# Patient Record
Sex: MALE | Race: BLACK OR AFRICAN AMERICAN | NOT HISPANIC OR LATINO | Employment: FULL TIME | URBAN - METROPOLITAN AREA
[De-identification: names, ages, dates, MRNs, and addresses within clinical notes are randomized per-mention and may not be internally consistent; named-entity substitution may affect disease eponyms.]

---

## 2024-10-09 ENCOUNTER — HOSPITAL ENCOUNTER (EMERGENCY)
Facility: HOSPITAL | Age: 39
Discharge: HOME | End: 2024-10-09
Attending: STUDENT IN AN ORGANIZED HEALTH CARE EDUCATION/TRAINING PROGRAM
Payer: COMMERCIAL

## 2024-10-09 ENCOUNTER — APPOINTMENT (OUTPATIENT)
Dept: RADIOLOGY | Facility: HOSPITAL | Age: 39
End: 2024-10-09
Payer: COMMERCIAL

## 2024-10-09 VITALS
WEIGHT: 183.42 LBS | RESPIRATION RATE: 16 BRPM | TEMPERATURE: 97.5 F | DIASTOLIC BLOOD PRESSURE: 92 MMHG | SYSTOLIC BLOOD PRESSURE: 145 MMHG | OXYGEN SATURATION: 98 % | HEART RATE: 62 BPM | HEIGHT: 68 IN | BODY MASS INDEX: 27.8 KG/M2

## 2024-10-09 DIAGNOSIS — M54.6 ACUTE LEFT-SIDED THORACIC BACK PAIN: ICD-10-CM

## 2024-10-09 DIAGNOSIS — R07.81 RIB PAIN ON LEFT SIDE: Primary | ICD-10-CM

## 2024-10-09 LAB
ALBUMIN SERPL BCP-MCNC: 4.3 G/DL (ref 3.4–5)
ALP SERPL-CCNC: 45 U/L (ref 33–120)
ALT SERPL W P-5'-P-CCNC: 20 U/L (ref 10–52)
ANION GAP SERPL CALC-SCNC: 9 MMOL/L (ref 10–20)
APPEARANCE UR: CLEAR
AST SERPL W P-5'-P-CCNC: 21 U/L (ref 9–39)
BASOPHILS # BLD AUTO: 0.02 X10*3/UL (ref 0–0.1)
BASOPHILS NFR BLD AUTO: 0.4 %
BILIRUB SERPL-MCNC: 0.7 MG/DL (ref 0–1.2)
BILIRUB UR STRIP.AUTO-MCNC: NEGATIVE MG/DL
BNP SERPL-MCNC: 6 PG/ML (ref 0–99)
BUN SERPL-MCNC: 16 MG/DL (ref 6–23)
CALCIUM SERPL-MCNC: 8.6 MG/DL (ref 8.6–10.3)
CARDIAC TROPONIN I PNL SERPL HS: <3 NG/L (ref 0–20)
CHLORIDE SERPL-SCNC: 102 MMOL/L (ref 98–107)
CO2 SERPL-SCNC: 30 MMOL/L (ref 21–32)
COLOR UR: COLORLESS
CREAT SERPL-MCNC: 1.15 MG/DL (ref 0.5–1.3)
EGFRCR SERPLBLD CKD-EPI 2021: 84 ML/MIN/1.73M*2
EOSINOPHIL # BLD AUTO: 0.04 X10*3/UL (ref 0–0.7)
EOSINOPHIL NFR BLD AUTO: 0.8 %
ERYTHROCYTE [DISTWIDTH] IN BLOOD BY AUTOMATED COUNT: 11.6 % (ref 11.5–14.5)
GLUCOSE SERPL-MCNC: 86 MG/DL (ref 74–99)
GLUCOSE UR STRIP.AUTO-MCNC: NORMAL MG/DL
HCT VFR BLD AUTO: 43.3 % (ref 41–52)
HGB BLD-MCNC: 14.5 G/DL (ref 13.5–17.5)
IMM GRANULOCYTES # BLD AUTO: 0.01 X10*3/UL (ref 0–0.7)
IMM GRANULOCYTES NFR BLD AUTO: 0.2 % (ref 0–0.9)
KETONES UR STRIP.AUTO-MCNC: NEGATIVE MG/DL
LEUKOCYTE ESTERASE UR QL STRIP.AUTO: NEGATIVE
LYMPHOCYTES # BLD AUTO: 3.01 X10*3/UL (ref 1.2–4.8)
LYMPHOCYTES NFR BLD AUTO: 63.5 %
MAGNESIUM SERPL-MCNC: 2 MG/DL (ref 1.6–2.4)
MCH RBC QN AUTO: 29.9 PG (ref 26–34)
MCHC RBC AUTO-ENTMCNC: 33.5 G/DL (ref 32–36)
MCV RBC AUTO: 89 FL (ref 80–100)
MONOCYTES # BLD AUTO: 0.56 X10*3/UL (ref 0.1–1)
MONOCYTES NFR BLD AUTO: 11.8 %
NEUTROPHILS # BLD AUTO: 1.1 X10*3/UL (ref 1.2–7.7)
NEUTROPHILS NFR BLD AUTO: 23.3 %
NITRITE UR QL STRIP.AUTO: NEGATIVE
NRBC BLD-RTO: 0 /100 WBCS (ref 0–0)
PH UR STRIP.AUTO: 6 [PH]
PLATELET # BLD AUTO: 236 X10*3/UL (ref 150–450)
POTASSIUM SERPL-SCNC: 3.7 MMOL/L (ref 3.5–5.3)
PROT SERPL-MCNC: 7 G/DL (ref 6.4–8.2)
PROT UR STRIP.AUTO-MCNC: NEGATIVE MG/DL
RBC # BLD AUTO: 4.85 X10*6/UL (ref 4.5–5.9)
RBC # UR STRIP.AUTO: NEGATIVE /UL
SODIUM SERPL-SCNC: 137 MMOL/L (ref 136–145)
SP GR UR STRIP.AUTO: 1.01
UROBILINOGEN UR STRIP.AUTO-MCNC: NORMAL MG/DL
WBC # BLD AUTO: 4.7 X10*3/UL (ref 4.4–11.3)

## 2024-10-09 PROCEDURE — 83880 ASSAY OF NATRIURETIC PEPTIDE: CPT | Performed by: STUDENT IN AN ORGANIZED HEALTH CARE EDUCATION/TRAINING PROGRAM

## 2024-10-09 PROCEDURE — 71046 X-RAY EXAM CHEST 2 VIEWS: CPT | Performed by: RADIOLOGY

## 2024-10-09 PROCEDURE — 71046 X-RAY EXAM CHEST 2 VIEWS: CPT

## 2024-10-09 PROCEDURE — 36415 COLL VENOUS BLD VENIPUNCTURE: CPT | Performed by: STUDENT IN AN ORGANIZED HEALTH CARE EDUCATION/TRAINING PROGRAM

## 2024-10-09 PROCEDURE — 84484 ASSAY OF TROPONIN QUANT: CPT | Performed by: STUDENT IN AN ORGANIZED HEALTH CARE EDUCATION/TRAINING PROGRAM

## 2024-10-09 PROCEDURE — 99283 EMERGENCY DEPT VISIT LOW MDM: CPT

## 2024-10-09 PROCEDURE — 83735 ASSAY OF MAGNESIUM: CPT | Performed by: STUDENT IN AN ORGANIZED HEALTH CARE EDUCATION/TRAINING PROGRAM

## 2024-10-09 PROCEDURE — 81003 URINALYSIS AUTO W/O SCOPE: CPT | Performed by: STUDENT IN AN ORGANIZED HEALTH CARE EDUCATION/TRAINING PROGRAM

## 2024-10-09 PROCEDURE — 84075 ASSAY ALKALINE PHOSPHATASE: CPT | Performed by: STUDENT IN AN ORGANIZED HEALTH CARE EDUCATION/TRAINING PROGRAM

## 2024-10-09 PROCEDURE — 85025 COMPLETE CBC W/AUTO DIFF WBC: CPT | Performed by: STUDENT IN AN ORGANIZED HEALTH CARE EDUCATION/TRAINING PROGRAM

## 2024-10-09 RX ORDER — KETOROLAC TROMETHAMINE 30 MG/ML
30 INJECTION, SOLUTION INTRAMUSCULAR; INTRAVENOUS ONCE
Status: DISCONTINUED | OUTPATIENT
Start: 2024-10-09 | End: 2024-10-09 | Stop reason: HOSPADM

## 2024-10-09 RX ORDER — KETOROLAC TROMETHAMINE 30 MG/ML
30 INJECTION, SOLUTION INTRAMUSCULAR; INTRAVENOUS ONCE
Status: DISCONTINUED | OUTPATIENT
Start: 2024-10-09 | End: 2024-10-09

## 2024-10-09 ASSESSMENT — COLUMBIA-SUICIDE SEVERITY RATING SCALE - C-SSRS
6. HAVE YOU EVER DONE ANYTHING, STARTED TO DO ANYTHING, OR PREPARED TO DO ANYTHING TO END YOUR LIFE?: NO
1. IN THE PAST MONTH, HAVE YOU WISHED YOU WERE DEAD OR WISHED YOU COULD GO TO SLEEP AND NOT WAKE UP?: NO
2. HAVE YOU ACTUALLY HAD ANY THOUGHTS OF KILLING YOURSELF?: NO

## 2024-10-09 ASSESSMENT — PAIN SCALES - GENERAL: PAINLEVEL_OUTOF10: 0 - NO PAIN

## 2024-10-09 ASSESSMENT — PAIN - FUNCTIONAL ASSESSMENT: PAIN_FUNCTIONAL_ASSESSMENT: 0-10

## 2024-10-09 NOTE — ED TRIAGE NOTES
He states he has mucus coming out and back pain he is from [place] and difficult to understand

## 2024-10-09 NOTE — DISCHARGE INSTRUCTIONS
Veuivivianez effectuer un suivi auprès d'un médecin traitant auprès duquel je vous ai assuré un suivi afin d'évaluer davantage la production de mucus.  Jolynn laboratoires et votre imagerie n'avaient rien de remarquable.  Je n'ai pas de réponse concernant votre production de mucus mais je peux vous dire qu'il ne s'agit pas d'une infection, d'une pneumonie, et jolynn laboratoires ne montrent aucune preuve de résultats préoccupants.

## 2024-10-10 NOTE — ED PROVIDER NOTES
HPI   Chief Complaint   Patient presents with    Back Pain       39-year-old male with no pertinent past medical history who presents with left side pain as well as mucus production.  He denies any cough, vomiting, diarrhea, nausea or vomiting.  Denies any chest pain or shortness of breath.  Just notes that he is having pain over in his left side with mucus production.  He also notices some foul-smelling sputum production that causes his breath to be bad.  He denies any sore throat, runny nose.  Does have recent travel history to that [facility].  No recent sick contacts, undulating fevers, or arthropod bites.              Patient History   No past medical history on file.  No past surgical history on file.  No family history on file.  Social History     Tobacco Use    Smoking status: Not on file    Smokeless tobacco: Not on file   Substance Use Topics    Alcohol use: Not on file    Drug use: Not on file       Physical Exam   ED Triage Vitals [10/09/24 0951]   Temperature Heart Rate Respirations BP   36.4 °C (97.5 °F) 66 16 114/88      Pulse Ox Temp src Heart Rate Source Patient Position   100 % -- -- --      BP Location FiO2 (%)     -- --       Physical Exam  Vitals and nursing note reviewed.   Constitutional:       Appearance: He is not ill-appearing.   HENT:      Head: Atraumatic.      Nose: Nose normal.      Mouth/Throat:      Mouth: Mucous membranes are moist.      Pharynx: Oropharynx is clear.   Eyes:      Pupils: Pupils are equal, round, and reactive to light.   Cardiovascular:      Rate and Rhythm: Normal rate and regular rhythm.      Pulses: Normal pulses.   Pulmonary:      Effort: Pulmonary effort is normal.      Breath sounds: Normal breath sounds.   Abdominal:      General: Abdomen is flat.   Musculoskeletal:         General: Tenderness (ribs on left 5-7) present.   Skin:     General: Skin is warm and dry.      Capillary Refill: Capillary refill takes less than 2 seconds.      Findings: No bruising,  erythema, lesion or rash.   Neurological:      General: No focal deficit present.      Mental Status: He is alert and oriented to person, place, and time.           ED Course & MDM   ED Course as of 10/09/24 2143   Wed Oct 09, 2024   1104 Patient is a 39-year-old male with no pertinent past medical history who presents with left side pain as well as mucus production.  He denies any cough, vomiting, diarrhea, nausea or vomiting.  Denies any chest pain or shortness of breath.  Just notes that he is having pain over in his left side with mucus production.  He also notices some foul-smelling sputum production that causes his breath to be bad.  He denies any sore throat, runny nose.  Does have recent travel history to that [facility].  No recent sick contacts, undulating fevers, or arthropod bites.    On exam, patient is resting comfortably in no acute distress, does have tenderness over the left ribs around 7 through 8.  Decreased breath sounds in this area, otherwise not tachycardic or tachypneic.  Overall well-appearing.    Differential diagnosis includes but not limited to pneumonia, underlying lung disease, could also consider more insidious pathology including TB.  He was vaccinated for childhood vaccines.   [KK]   1243 Lab work and chest x-ray unremarkable. [KK]   1308 Patient notes pain is tolerable, has not not received his [KK]      ED Course User Index  [KK] Vic Braga DO         Diagnoses as of 10/09/24 2143   Acute left-sided thoracic back pain   Rib pain on left side                 No data recorded                                 Medical Decision Making  Patient presented with nonspecific left-sided rib pain somewhat high for his flank and low for his scapula.  History exam and lab work largely unremarkable.  In regards to his sputum, no evidence of bronchitis or other pulmonary based etiology.  Suspect pain is likely musculoskeletal.  Encouraged him to follow-up with his primary care physician for  definitive workup and management and to continue over-the-counter medications including ibuprofen and Tylenol for pain management. Discussed signs and symptoms to return the emergency department, all questions were answered, patient agreeable with disposition and discharged in stable condition.         Procedure  Procedures     Vic Braga DO  10/09/24 4162

## 2024-10-25 ENCOUNTER — HOSPITAL ENCOUNTER (EMERGENCY)
Facility: HOSPITAL | Age: 39
Discharge: HOME | End: 2024-10-25
Payer: COMMERCIAL

## 2024-10-25 ENCOUNTER — APPOINTMENT (OUTPATIENT)
Dept: RADIOLOGY | Facility: HOSPITAL | Age: 39
End: 2024-10-25
Payer: COMMERCIAL

## 2024-10-25 VITALS
SYSTOLIC BLOOD PRESSURE: 129 MMHG | WEIGHT: 182.98 LBS | HEART RATE: 66 BPM | DIASTOLIC BLOOD PRESSURE: 86 MMHG | BODY MASS INDEX: 27.73 KG/M2 | TEMPERATURE: 97.4 F | RESPIRATION RATE: 18 BRPM | OXYGEN SATURATION: 100 % | HEIGHT: 68 IN

## 2024-10-25 DIAGNOSIS — M54.42 ACUTE LEFT-SIDED LOW BACK PAIN WITH LEFT-SIDED SCIATICA: Primary | ICD-10-CM

## 2024-10-25 PROCEDURE — 2500000005 HC RX 250 GENERAL PHARMACY W/O HCPCS

## 2024-10-25 PROCEDURE — 99284 EMERGENCY DEPT VISIT MOD MDM: CPT | Mod: 25

## 2024-10-25 PROCEDURE — 72110 X-RAY EXAM L-2 SPINE 4/>VWS: CPT | Performed by: RADIOLOGY

## 2024-10-25 PROCEDURE — 93971 EXTREMITY STUDY: CPT

## 2024-10-25 PROCEDURE — 93971 EXTREMITY STUDY: CPT | Performed by: RADIOLOGY

## 2024-10-25 PROCEDURE — 2500000004 HC RX 250 GENERAL PHARMACY W/ HCPCS (ALT 636 FOR OP/ED)

## 2024-10-25 PROCEDURE — 96372 THER/PROPH/DIAG INJ SC/IM: CPT

## 2024-10-25 PROCEDURE — 2500000001 HC RX 250 WO HCPCS SELF ADMINISTERED DRUGS (ALT 637 FOR MEDICARE OP)

## 2024-10-25 PROCEDURE — 72110 X-RAY EXAM L-2 SPINE 4/>VWS: CPT

## 2024-10-25 RX ORDER — ACETAMINOPHEN 500 MG
1000 TABLET ORAL EVERY 8 HOURS PRN
Qty: 18 TABLET | Refills: 0 | Status: SHIPPED | OUTPATIENT
Start: 2024-10-25 | End: 2024-10-28

## 2024-10-25 RX ORDER — NAPROXEN 500 MG/1
500 TABLET ORAL EVERY 12 HOURS
Qty: 6 TABLET | Refills: 0 | Status: SHIPPED | OUTPATIENT
Start: 2024-10-25 | End: 2024-10-28

## 2024-10-25 RX ORDER — ACETAMINOPHEN 325 MG/1
975 TABLET ORAL ONCE
Status: COMPLETED | OUTPATIENT
Start: 2024-10-25 | End: 2024-10-25

## 2024-10-25 RX ORDER — LIDOCAINE 50 MG/G
1 PATCH TOPICAL DAILY
Qty: 5 PATCH | Refills: 0 | Status: SHIPPED | OUTPATIENT
Start: 2024-10-25 | End: 2024-10-30

## 2024-10-25 RX ORDER — LIDOCAINE 560 MG/1
1 PATCH PERCUTANEOUS; TOPICAL; TRANSDERMAL ONCE
Status: DISCONTINUED | OUTPATIENT
Start: 2024-10-25 | End: 2024-10-25 | Stop reason: HOSPADM

## 2024-10-25 RX ORDER — KETOROLAC TROMETHAMINE 30 MG/ML
15 INJECTION, SOLUTION INTRAMUSCULAR; INTRAVENOUS ONCE
Status: COMPLETED | OUTPATIENT
Start: 2024-10-25 | End: 2024-10-25

## 2024-10-25 ASSESSMENT — PAIN DESCRIPTION - PAIN TYPE: TYPE: ACUTE PAIN

## 2024-10-25 ASSESSMENT — PAIN - FUNCTIONAL ASSESSMENT: PAIN_FUNCTIONAL_ASSESSMENT: 0-10

## 2024-10-25 ASSESSMENT — PAIN DESCRIPTION - PROGRESSION: CLINICAL_PROGRESSION: GRADUALLY WORSENING

## 2024-10-25 ASSESSMENT — PAIN DESCRIPTION - FREQUENCY: FREQUENCY: INTERMITTENT

## 2024-10-25 ASSESSMENT — PAIN DESCRIPTION - DESCRIPTORS: DESCRIPTORS: SHOOTING

## 2024-10-25 ASSESSMENT — PAIN DESCRIPTION - ONSET: ONSET: GRADUAL

## 2024-10-25 ASSESSMENT — PAIN DESCRIPTION - LOCATION: LOCATION: BACK

## 2024-10-25 ASSESSMENT — PAIN SCALES - GENERAL: PAINLEVEL_OUTOF10: 8

## 2024-10-25 NOTE — DISCHARGE INSTRUCTIONS
Please return to the ED immediately if you have any new or worsening signs or symptoms  Please follow-up with your primary care doctor within 3 days  Please return to the ED immediately if you begin to experience difficulty controlling your bowel or bladder, numbness in the genital/groin area, or any other new or worsening signs or symptoms

## 2024-10-25 NOTE — ED PROVIDER NOTES
HPI   Chief Complaint   Patient presents with    Back Pain       39-year-old male otherwise healthy presents the ED today with a chief concern of back pain.  Patient reports symptoms started about a week ago.  No known trauma or injury to the area.  Reports that he has back pain starting at the left side of the buttocks/low back radiating down into the left leg.  He denies any numbness or weakness or tingling.  No fevers but no nausea or vomiting.  No leg swelling.  No history of PE or DVT.  No abdominal pain, chest pain, or shortness of breath.  No syncope.  No urinary symptoms.  Denies saddle anesthesia or urinary/fecal incontinence or retention.  Denies any history of drug use.  Is not anticoagulated.  Did not take any medications at home for the pain.  Has a heating pad on right now.  He has no other symptoms or concerns at this time.      History provided by:  Patient  History limited by: language barrier.   used: No (offered  patient declined)            Patient History   No past medical history on file.  No past surgical history on file.  No family history on file.  Social History     Tobacco Use    Smoking status: Not on file    Smokeless tobacco: Not on file   Substance Use Topics    Alcohol use: Not on file    Drug use: Not on file       Physical Exam   ED Triage Vitals [10/25/24 0836]   Temperature Heart Rate Respirations BP   36.3 °C (97.4 °F) 66 18 129/86      Pulse Ox Temp Source Heart Rate Source Patient Position   100 % Temporal Monitor Sitting      BP Location FiO2 (%)     Left arm --       Physical Exam  General: The pain the patient is sitting comfortably no acute distress.  Vital signs per nursing note.  Skin: No rashes, lesions, scars.  Normal skin turgor.  Head: Atraumatic normocephalic  Neck: The neck is supple.  The trachea is midline.  Lungs: Lungs are clear to auscultation bilaterally.  No rhonchi, wheezing, or rales.  No stridor.  Symmetric chest  Patient called back to schedule an appointment in 01/2022. She asked for her medication to be sent in to get her to her appointment. Thanks. expansion  Heart: Normal S1-S2 no murmurs, rubs, gallops.  Abdomen: Abdomen is flat, nontender, nondistended.  No rebound tenderness or guarding. No pulsatile mass.  No CVA tenderness  Peripheral vascular: Symmetric 2+ radial, dorsalis pedis, and posterior tibial pulses.  Capillary refill is under 3 seconds.  Neurologic: Alert and oriented x4.  5/5 strength in the upper and lower extremity.  Sensation is intact in the upper and lower extremity.  Sensation is intact in the inner thigh and groin.  Patient is able to cross the legs, extend and flex the knee, point the great toe up, dorsiflex and plantarflex the ankle.  2+ reflexes at the patellar tendon. Normal gait.  Musculoskeletal: No overlying skin changes throughout the entire back.  No C or T-spine tenderness.  Mild upper L-spine tenderness.  There is tenderness over the left lumbar paraspinal muscle.  Full range of motion of the back.    : Deferred      ED Course & MDM   ED Course as of 10/25/24 1224   Fri Oct 25, 2024   1016 IMPRESSION:  No sonographic evidence for deep vein thrombosis within the evaluated  veins of the left lower extremity.      MACRO:  None      Signed by: Casimiro Bergeron 10/25/2024 9:58 AM  Dictation workstation:   [id number]   []   1052 FINDINGS:  Disk space height is  preserved throughout.  Bilateral SI joints are patent.  No lytic or blastic bone lesion.  No listhesis.  No compression fracture.      Moderate to pronounced retained colonic stool and gas.  Nonobstructive, nonspecific bowel gas pattern.      IMPRESSION:  Moderate to pronounced retained colonic stool throughout. Otherwise,  negative exam.      MACRO:  None      Signed by: Garry Silva 10/25/2024 10:34 AM  Dictation workstation:   [id number]   []      ED Course User Index  [] Albert Min PA-C         Diagnoses as of 10/25/24 1224   Acute left-sided low back pain with left-sided sciatica                 No data recorded     Manav Coma Scale Score: 15 (10/25/24  0837 : Dominga Jones RN)                           Medical Decision Making  39-year-old male presents the ED today with a chief concern of low back pain.  Vital signs reassuring.  Patient overall appears well and is nontoxic-appearing.   patient was given Toradol, Tylenol, and a topical lidocaine patch in the ED. Patient has full range of motion of the neck without any meningismus.  Satting well on room air.  Not hypoxic.  Not tachycardic.  Afebrile.  He has no signs of PE.  Do not think further workup with D-dimer or CT PE scan is indicated at this time.  Has clear reproducible musculoskeletal tenderness on exam.  No signs of cord compression.  No emergent MRI indicated at this time.  No  symptoms.  Will defer urinalysis at this time.  The ultrasound shows no evidence of DVT and the x-ray of the L-spine shows no fracture.  Abdomen is soft and nontender.  Have low suspicion of any AAA at this time.  Is neuro intact.  Neurovascular status intact in lower extremities bilaterally.  No signs of acute arterial occlusion.  No risk factors for epidural abscess or hematoma.  There is no zoster rash.  Will treat outpatient with analgesics.  Discussed use and possible side effects of these medications.  Discussed my impression and findings with patient he feels comfortable returning home.  We discussed very strict return precautions including returning for any new or worsening signs or symptoms.  Patient is in agreement this plan.  He will follow-up with his PCP within 3 days.  Discussed signs and symptoms of cord compression.    Differential diagnosis: DVT, acute arterial occlusion, back strain, AAA rupture, cauda equina syndrome, cord compression, compression fracture, epidural abscess, epidural hematoma, herniated disc, sciatica, nephrolithiasis, ureterolithiasis, PE, pyelonephritis, vertebral fracture, pancreatitis, zoster    Disposition/treatment  1.  See above    Shared decision-making was used patient feels  comfortable returning home     Patient was advised to follow up with recommended provider in 1 day1 for another evaluation and exam. I advised patient/guardian to return or go to closest emergency room immediately if symptoms change, get worse, new symptoms develop prior to follow up. If there is no improvement in symptoms in the next 24 hours they are advised to return for further evaluation and exam. I also explained the plan and treatment course. Patient/guardian is in agreement with plan, treatment course, and follow up and states verbally that they will comply.    Homegoing. I discussed the differential; results and discharge plan with the patient and/or family/friend/caregiver if present.  I emphasized the importance of follow-up with the physician I referred them to in the timeframe recommended.  I explained reasons for the patient to return to the Emergency Department. They agreed that if they feel their condition is worsening or if they have any other concern they should call 911 immediately for further assistance. I gave the patient an opportunity to ask all questions they had and answered all of them accordingly. They understand return precautions and discharge instructions. The patient and/or family/friend/caregiver expressed understanding verbally and that they would comply.        This note has been transcribed using voice recognition and may contain grammatical errors, misplaced words, incorrect words, incorrect phrases or other errors.        Procedure  Procedures     Albert Min PA-C  10/25/24 1225       Albert Min PA-C  10/25/24 1225

## 2024-10-25 NOTE — ED TRIAGE NOTES
Patient presents to ED from home for lower back pain that radiates down his left leg that started one week ago. Patient denies injury, numbness or tingling, or bathroom difficulties.

## 2024-12-30 ENCOUNTER — APPOINTMENT (OUTPATIENT)
Dept: PRIMARY CARE | Facility: CLINIC | Age: 39
End: 2024-12-30
Payer: COMMERCIAL

## 2025-03-07 ENCOUNTER — APPOINTMENT (OUTPATIENT)
Dept: PRIMARY CARE | Facility: CLINIC | Age: 40
End: 2025-03-07
Payer: COMMERCIAL

## 2025-03-07 VITALS
DIASTOLIC BLOOD PRESSURE: 91 MMHG | BODY MASS INDEX: 27.22 KG/M2 | SYSTOLIC BLOOD PRESSURE: 135 MMHG | TEMPERATURE: 98.6 F | WEIGHT: 179 LBS | HEART RATE: 73 BPM

## 2025-03-07 DIAGNOSIS — D22.9 SKIN MOLE: Primary | ICD-10-CM

## 2025-03-07 DIAGNOSIS — M54.41 CHRONIC BILATERAL LOW BACK PAIN WITH BILATERAL SCIATICA: ICD-10-CM

## 2025-03-07 DIAGNOSIS — M54.42 CHRONIC BILATERAL LOW BACK PAIN WITH BILATERAL SCIATICA: ICD-10-CM

## 2025-03-07 DIAGNOSIS — G89.29 CHRONIC BILATERAL LOW BACK PAIN WITH BILATERAL SCIATICA: ICD-10-CM

## 2025-03-07 DIAGNOSIS — F52.4: ICD-10-CM

## 2025-03-07 DIAGNOSIS — K21.9 GASTRIC REFLUX: ICD-10-CM

## 2025-03-07 PROCEDURE — 99204 OFFICE O/P NEW MOD 45 MIN: CPT | Performed by: FAMILY MEDICINE

## 2025-03-07 RX ORDER — IBUPROFEN 800 MG/1
800 TABLET ORAL 3 TIMES DAILY PRN
Qty: 60 TABLET | Refills: 3 | Status: SHIPPED | OUTPATIENT
Start: 2025-03-07 | End: 2026-03-07

## 2025-03-07 RX ORDER — OMEPRAZOLE 20 MG/1
20 CAPSULE, DELAYED RELEASE ORAL DAILY
Qty: 30 CAPSULE | Refills: 5 | Status: SHIPPED | OUTPATIENT
Start: 2025-03-07 | End: 2025-09-03

## 2025-03-07 NOTE — ASSESSMENT & PLAN NOTE
-Omeprazole 20 mg every day started today.  -Diet and lifestyle changes discussed.   -Breath test.

## 2025-03-07 NOTE — ASSESSMENT & PLAN NOTE
-persisting associated local prickling sensation. No change in size.   -referral back to dermatologist done.

## 2025-03-07 NOTE — PROGRESS NOTES
Subjective   Patient ID: Susan Mcpherson is a 39 y.o. male who presents for Establish Care.  HPI  The patient is a 38 yo Male with no history, here for establishment of care and for the management:    1-left scapular mole.  It was evaluated by a dermatologist in 7/2024 but is still causing some local pain despite the recurring application of triamcinolone  2- gastric reflux, worse after a meal.   3- low back pain that started in 2022.  It was treated in the ER on 10/24/2024) with Toradol, Tylenol and topical lidocaine patches.   4- Early ejaculation.     A review of system was completed.  All systems were reviewed and were normal, except for the ones that are listed in the HPI.    Objective   Physical Exam  Constitutional:       Appearance: Normal appearance.   HENT:      Head: Normocephalic and atraumatic.      Right Ear: Tympanic membrane, ear canal and external ear normal.      Left Ear: Tympanic membrane, ear canal and external ear normal.      Nose: Nose normal.      Mouth/Throat:      Mouth: Mucous membranes are moist.      Pharynx: Oropharynx is clear.   Eyes:      Extraocular Movements: Extraocular movements intact.      Conjunctiva/sclera: Conjunctivae normal.      Pupils: Pupils are equal, round, and reactive to light.   Cardiovascular:      Rate and Rhythm: Normal rate and regular rhythm.      Pulses: Normal pulses.   Pulmonary:      Effort: Pulmonary effort is normal.      Breath sounds: Normal breath sounds.   Abdominal:      General: Abdomen is flat. Bowel sounds are normal.      Palpations: Abdomen is soft.   Musculoskeletal:         General: Normal range of motion.      Cervical back: Normal range of motion and neck supple.   Skin:     General: Skin is warm.   Neurological:      General: No focal deficit present.      Mental Status: He is alert and oriented to person, place, and time. Mental status is at baseline.   Psychiatric:         Mood and Affect: Mood normal.         Behavior: Behavior normal.          Thought Content: Thought content normal.         Judgment: Judgment normal.         Assessment/Plan   Problem List Items Addressed This Visit       Chronic bilateral low back pain with bilateral sciatica     -Ibuprofen 800 mg TID for 2 weeks then PRN.   -Local moist heat.   -Lidocaine patch.          Relevant Medications    ibuprofen 800 mg tablet    Gastric reflux     -Omeprazole 20 mg every day started today.  -Diet and lifestyle changes discussed.   -Breath test.         Relevant Medications    omeprazole (PriLOSEC) 20 mg DR capsule    Other Relevant Orders    H. Pylori Breath Test    Skin mole - Primary     -persisting associated local prickling sensation. No change in size.   -referral back to dermatologist done.            Relevant Orders    Referral to Dermatology    Premature (early) ejaculation     -Urologist referral done.          Relevant Orders    Referral to Urology    Patient to return to office in 2- 4 weeks PRN.

## 2025-03-10 LAB — UREA BREATH TEST QL: DETECTED

## 2025-03-17 ENCOUNTER — TELEPHONE (OUTPATIENT)
Dept: PRIMARY CARE | Facility: CLINIC | Age: 40
End: 2025-03-17
Payer: COMMERCIAL

## 2025-04-03 NOTE — PROGRESS NOTES
Subjective   Patient ID: Susan Mcpherson is a 39 y.o. male    HPI  39 y.o. male who presents to [facility] for premature ejaculation. He was referred by his PCP Dr. Faulkner. ***          Review of Systems    All systems were reviewed. Anything negative was noted in the HPI.    Objective   Physical Exam    General: Well developed, well nourished, alert and cooperative, appears in no acute distress   Eyes: Non-injected conjunctiva, sclera clear, no proptosis   Cardiac: Extremities are warm and well perfused. No edema, cyanosis or pallor   Lungs: Breathing is easy, non-labored. Speaking in clear and complete sentences. Normal diaphragmatic movement   MSK: Ambulatory with steady gait, unassisted   Neuro: Alert and oriented to person, place, and time   Psych: Demonstrates good judgment and reason, without hallucinations, abnormal affect or abnormal behaviors   Skin: No obvious lesions, no rashes       No CVA tenderness bilaterally   No suprapubic pain or discomfort       No past medical history on file.      No past surgical history on file.      ***  Assessment/Plan   ***    39 y.o. male who presents for the above condition, {wehads:[id number]}     Plan:  - ***    E&M visit today is associated with current or anticipated ongoing medical care services related to a patient's single, serious condition or a complex condition.     4/9/2025    Scribe Attestation  By signing my name below, I, ***, Scribe attest that this documentation has been prepared under the direction and in the presence of Dr. Ayo Serrano.

## 2025-04-09 ENCOUNTER — OFFICE VISIT (OUTPATIENT)
Dept: UROLOGY | Facility: HOSPITAL | Age: 40
End: 2025-04-09
Payer: COMMERCIAL

## 2025-04-09 DIAGNOSIS — F52.4: Primary | ICD-10-CM

## 2025-04-09 PROCEDURE — G2211 COMPLEX E/M VISIT ADD ON: HCPCS | Performed by: STUDENT IN AN ORGANIZED HEALTH CARE EDUCATION/TRAINING PROGRAM

## 2025-04-09 PROCEDURE — 99214 OFFICE O/P EST MOD 30 MIN: CPT | Performed by: STUDENT IN AN ORGANIZED HEALTH CARE EDUCATION/TRAINING PROGRAM

## 2025-04-09 PROCEDURE — 99204 OFFICE O/P NEW MOD 45 MIN: CPT | Performed by: STUDENT IN AN ORGANIZED HEALTH CARE EDUCATION/TRAINING PROGRAM

## 2025-04-09 RX ORDER — PAROXETINE 10 MG/1
10 TABLET, FILM COATED ORAL EVERY MORNING
Qty: 30 TABLET | Refills: 11 | Status: SHIPPED | OUTPATIENT
Start: 2025-04-09 | End: 2026-04-09

## 2025-04-09 NOTE — PROGRESS NOTES
Subjective   Patient ID: Susan Mcpherson is a 39 y.o. male    HPI  39 y.o. male who presents to [facility] for premature ejaculation. He was referred by his PCP Dr. Faulkner. Today, he states having good erection but note he ejaculates fast within one minute or less. He does not masturbate due to [employer] issue. Hence, he would like to try medication.      Review of Systems    All systems were reviewed. Anything negative was noted in the HPI.    Objective   Physical Exam  Genitourinary:     Pubic Area: No rash.       Penis: Normal and circumcised. No hypospadias.       Testes:         Right: Mass, tenderness, swelling, testicular hydrocele or varicocele not present. Right testis is descended.         Left: Mass, tenderness, swelling, testicular hydrocele or varicocele not present. Left testis is descended.      Epididymis:      Right: Not inflamed or enlarged. No mass or tenderness.      Left: Not inflamed or enlarged. No mass or tenderness.         General: Well developed, well nourished, alert and cooperative, appears in no acute distress   Eyes: Non-injected conjunctiva, sclera clear, no proptosis   Cardiac: Extremities are warm and well perfused. No edema, cyanosis or pallor   Lungs: Breathing is easy, non-labored. Speaking in clear and complete sentences. Normal diaphragmatic movement   MSK: Ambulatory with steady gait, unassisted   Neuro: Alert and oriented to person, place, and time   Psych: Demonstrates good judgment and reason, without hallucinations, abnormal affect or abnormal behaviors   Skin: No obvious lesions, no rashes       No CVA tenderness bilaterally   No suprapubic pain or discomfort       No past medical history on file.      No past surgical history on file.        Assessment/Plan   Premature ejaculation    39 y.o. male who presents for the above condition, We had a very long and extensive discussion with the patient regarding the pathophysiology, differential diagnosis, risk factor,  management, natural history, incidence and diagnostic work-up of the condition.      Plan:  - Recommend frequent ejaculation (at least three times a week).  - Recommend using a condom for delayed ejaculation.   - Recommend to masturbate an hour before sexual activities.   - Prescribed Paroxetine 10 mg once daily.   - Follow up in 6 months.    E&M visit today is associated with current or anticipated ongoing medical care services related to a patient's single, serious condition or a complex condition.     4/9/2025    Scribe Attestation  By signing my name below, Sarah ZARAGOZA Scribe attest that this documentation has been prepared under the direction and in the presence of Dr. Ayo Serrano.

## 2025-04-14 ENCOUNTER — APPOINTMENT (OUTPATIENT)
Dept: PRIMARY CARE | Facility: CLINIC | Age: 40
End: 2025-04-14
Payer: COMMERCIAL

## 2025-04-14 VITALS
SYSTOLIC BLOOD PRESSURE: 148 MMHG | TEMPERATURE: 98 F | WEIGHT: 178 LBS | DIASTOLIC BLOOD PRESSURE: 84 MMHG | BODY MASS INDEX: 27.06 KG/M2 | HEART RATE: 76 BPM

## 2025-04-14 DIAGNOSIS — M54.41 CHRONIC BILATERAL LOW BACK PAIN WITH BILATERAL SCIATICA: ICD-10-CM

## 2025-04-14 DIAGNOSIS — K29.70 HELICOBACTER PYLORI GASTRITIS: ICD-10-CM

## 2025-04-14 DIAGNOSIS — M54.42 CHRONIC BILATERAL LOW BACK PAIN WITH BILATERAL SCIATICA: ICD-10-CM

## 2025-04-14 DIAGNOSIS — M54.50 CHRONIC BILATERAL LOW BACK PAIN, UNSPECIFIED WHETHER SCIATICA PRESENT: ICD-10-CM

## 2025-04-14 DIAGNOSIS — G89.29 CHRONIC BILATERAL LOW BACK PAIN, UNSPECIFIED WHETHER SCIATICA PRESENT: ICD-10-CM

## 2025-04-14 DIAGNOSIS — G89.29 CHRONIC BILATERAL LOW BACK PAIN WITH BILATERAL SCIATICA: ICD-10-CM

## 2025-04-14 DIAGNOSIS — K64.1 GRADE II HEMORRHOIDS: Primary | ICD-10-CM

## 2025-04-14 DIAGNOSIS — B96.81 HELICOBACTER PYLORI GASTRITIS: ICD-10-CM

## 2025-04-14 PROCEDURE — 99214 OFFICE O/P EST MOD 30 MIN: CPT | Performed by: FAMILY MEDICINE

## 2025-04-14 RX ORDER — HYDROGEN PEROXIDE 3 %
20 SOLUTION, NON-ORAL MISCELLANEOUS 2 TIMES DAILY
Qty: 30 CAPSULE | Refills: 0 | Status: SHIPPED | OUTPATIENT
Start: 2025-04-14 | End: 2026-04-14

## 2025-04-14 RX ORDER — HYDROCORTISONE ACETATE 25 MG/1
25 SUPPOSITORY RECTAL 2 TIMES DAILY PRN
Qty: 30 SUPPOSITORY | Refills: 5 | Status: SHIPPED | OUTPATIENT
Start: 2025-04-14 | End: 2026-04-14

## 2025-04-14 RX ORDER — TETRACYCLINE HYDROCHLORIDE 500 MG/1
500 CAPSULE ORAL 4 TIMES DAILY
Qty: 60 CAPSULE | Refills: 0 | Status: SHIPPED | OUTPATIENT
Start: 2025-04-14 | End: 2025-04-29

## 2025-04-14 RX ORDER — MELOXICAM 15 MG/1
15 TABLET ORAL DAILY
Qty: 30 TABLET | Refills: 3 | Status: SHIPPED | OUTPATIENT
Start: 2025-04-14 | End: 2026-04-14

## 2025-04-14 RX ORDER — METRONIDAZOLE 500 MG/1
500 TABLET ORAL 3 TIMES DAILY
Qty: 45 TABLET | Refills: 0 | Status: SHIPPED | OUTPATIENT
Start: 2025-04-14 | End: 2025-04-29

## 2025-04-14 ASSESSMENT — PATIENT HEALTH QUESTIONNAIRE - PHQ9
SUM OF ALL RESPONSES TO PHQ9 QUESTIONS 1 & 2: 2
10. IF YOU CHECKED OFF ANY PROBLEMS, HOW DIFFICULT HAVE THESE PROBLEMS MADE IT FOR YOU TO DO YOUR WORK, TAKE CARE OF THINGS AT HOME, OR GET ALONG WITH OTHER PEOPLE: NOT DIFFICULT AT ALL
1. LITTLE INTEREST OR PLEASURE IN DOING THINGS: SEVERAL DAYS
2. FEELING DOWN, DEPRESSED OR HOPELESS: SEVERAL DAYS

## 2025-04-14 NOTE — PROGRESS NOTES
Subjective   Patient ID: Susan Mcpherson is a 39 y.o. male who presents for Follow-up.  HPI  The patient is a 38 yo Male with no history, here for for the management:    1- frequent hemorrhoid exacerbation and rectal bleeding.  2- gastric reflux, worse after a meal.  He had a positive H. Pylori infection.   3- low back pain that started in 2022.  It was treated in the ER on 10/24/2024) with Toradol, Tylenol and topical lidocaine patches.  It is partially responding to Ibuprofen.     A review of system was completed.  All systems were reviewed and were normal, except for the ones that are listed in the HPI.    Objective   Physical Exam  Constitutional:       Appearance: Normal appearance.   HENT:      Head: Normocephalic and atraumatic.      Right Ear: Tympanic membrane, ear canal and external ear normal.      Left Ear: Tympanic membrane, ear canal and external ear normal.      Nose: Nose normal.      Mouth/Throat:      Mouth: Mucous membranes are moist.      Pharynx: Oropharynx is clear.   Eyes:      Extraocular Movements: Extraocular movements intact.      Conjunctiva/sclera: Conjunctivae normal.      Pupils: Pupils are equal, round, and reactive to light.   Cardiovascular:      Rate and Rhythm: Normal rate and regular rhythm.      Pulses: Normal pulses.   Pulmonary:      Effort: Pulmonary effort is normal.      Breath sounds: Normal breath sounds.   Abdominal:      General: Abdomen is flat. Bowel sounds are normal.      Palpations: Abdomen is soft.   Musculoskeletal:         General: Normal range of motion.      Cervical back: Normal range of motion and neck supple.   Skin:     General: Skin is warm.   Neurological:      General: No focal deficit present.      Mental Status: He is alert and oriented to person, place, and time. Mental status is at baseline.   Psychiatric:         Mood and Affect: Mood normal.         Behavior: Behavior normal.         Thought Content: Thought content normal.         Judgment: Judgment  normal.     Assessment/Plan   Problem List Items Addressed This Visit       Chronic bilateral low back pain with bilateral sciatica    Relevant Medications    meloxicam (Mobic) 15 mg tablet    Other Relevant Orders    Referral to Physical Therapy    Grade II hemorrhoids - Primary     -hemorrhoid suppository BID PRN started.  -GI referral if not better.         Relevant Medications    hydrocortisone (Anusol-HC) 25 mg suppository    Other Relevant Orders    Referral to Gastroenterology    Helicobacter pylori gastritis     -Quadruple therapy started today.   -Diet and life style changes.          Relevant Medications    esomeprazole (NexIUM) 20 mg DR capsule    bismuth subsalicylate (Pepto Bismol) 262 mg tablet    tetracycline 500 mg capsule    metroNIDAZOLE (Flagyl) 500 mg tablet    Chronic bilateral low back pain     -Ibuprofen stopped.  -Meloxicam 15 mg every day started today. Side effects discussed.          Relevant Medications    meloxicam (Mobic) 15 mg tablet    Other Relevant Orders    Referral to Physical Therapy    Referral to Physical Therapy    Patient to return to office in 2-4 weeks.

## 2025-04-30 DIAGNOSIS — B96.81 HELICOBACTER PYLORI GASTRITIS: Primary | ICD-10-CM

## 2025-04-30 DIAGNOSIS — K29.70 HELICOBACTER PYLORI GASTRITIS: Primary | ICD-10-CM

## 2025-05-01 RX ORDER — HYDROGEN PEROXIDE 3 %
20 SOLUTION, NON-ORAL MISCELLANEOUS 2 TIMES DAILY
Qty: 90 CAPSULE | Refills: 1 | Status: SHIPPED | OUTPATIENT
Start: 2025-05-01 | End: 2026-05-01